# Patient Record
(demographics unavailable — no encounter records)

---

## 2024-10-24 NOTE — PHYSICAL EXAM
[Ambulatory and capable of all self care but unable to carry out any work activities] : Status 2- Ambulatory and capable of all self care but unable to carry out any work activities. Up and about more than 50% of waking hours [Normal] : affect appropriate [de-identified] : anicteric

## 2024-10-24 NOTE — ADDENDUM
[FreeTextEntry1] : All medical record entries made by the Scribe were at my, Dr. Mukul Ramos, direction and personally dictated by me on 10/23/2024. I have reviewed the chart and agree that the record accurately reflects my personal performance of the history, physical exam, assessment and plan. I have also personally directed, reviewed, and agreed with the chart.   I, Karlos Carlton, documented this note as a scribe on behalf of Dr. Mukul Ramos on 10/23/2024.

## 2024-10-24 NOTE — RESULTS/DATA
[FreeTextEntry1] : Ms. Nelson is a pleasant 89 year-old woman admitted for symptomatic anemia in the setting of recent NSAID and DOAC use, here in follow up.

## 2024-10-24 NOTE — PHYSICAL EXAM
[Ambulatory and capable of all self care but unable to carry out any work activities] : Status 2- Ambulatory and capable of all self care but unable to carry out any work activities. Up and about more than 50% of waking hours [Normal] : affect appropriate [de-identified] : anicteric

## 2024-10-24 NOTE — HISTORY OF PRESENT ILLNESS
[de-identified] : 88 yo F here for evaluation of anemia. Recent hospitalization in early June, 2024 to [facility] [place] for symptomatic anemia. Received multiple units of PRBCs at a prior admission for similar symptoms in May. Most recent hgb 7.9g, refusing GI evaluation for presumed blood loss. Ferritin during hospitalization was 17. Xarelto for afib was held. Received 2 doses of venofer 100mg.  Currently taking iron supplements.  On questioning, Gin states that she was taking 800 mg ibuprofen prior to her first hospitalization. Patient was severely fatigued and had cognitive symptoms, but improved now.  Non-Smoker/ No Alcohol. Denies fever, SOB, chest pain, night sweats, or weight loss. No spontaneous bruising or bleeding.   [de-identified] : She returns today for a follow up. Continues w/ po iron. Takes daily stool softeners which assists with related constipation. Denies any bleeding. Notes improvement in energy levels, allowing her to do daily tasks including laundry and cooking.  Today's CBC: WBC 6.84 K/uL, HGB 10.2 g/dL, HCT 33.5%, .

## 2024-10-24 NOTE — REVIEW OF SYSTEMS
[Fever] : no fever [Chills] : no chills [Fatigue] : no fatigue [Chest Pain] : no chest pain [Palpitations] : no palpitations [Shortness Of Breath] : no shortness of breath [Wheezing] : no wheezing [Abdominal Pain] : no abdominal pain [Vomiting] : no vomiting [Easy Bleeding] : no tendency for easy bleeding [Easy Bruising] : no tendency for easy bruising

## 2024-10-24 NOTE — HISTORY OF PRESENT ILLNESS
[de-identified] : 88 yo F here for evaluation of anemia. Recent hospitalization in early June, 2024 to [facility] [place] for symptomatic anemia. Received multiple units of PRBCs at a prior admission for similar symptoms in May. Most recent hgb 7.9g, refusing GI evaluation for presumed blood loss. Ferritin during hospitalization was 17. Xarelto for afib was held. Received 2 doses of venofer 100mg.  Currently taking iron supplements.  On questioning, Gin states that she was taking 800 mg ibuprofen prior to her first hospitalization. Patient was severely fatigued and had cognitive symptoms, but improved now.  Non-Smoker/ No Alcohol. Denies fever, SOB, chest pain, night sweats, or weight loss. No spontaneous bruising or bleeding.   [de-identified] : She returns today for a follow up. Continues w/ po iron. Takes daily stool softeners which assists with related constipation. Denies any bleeding. Notes improvement in energy levels, allowing her to do daily tasks including laundry and cooking.  Today's CBC: WBC 6.84 K/uL, HGB 10.2 g/dL, HCT 33.5%, .

## 2024-11-04 NOTE — PLAN
[FreeTextEntry1] :  7 day supplu of oxycodone given  instructed to follow up with pain management  we can document an appointment and try to bridge your medication  healthy diet

## 2024-11-04 NOTE — PHYSICAL EXAM
[Normal] : normal rate, regular rhythm, normal S1 and S2 and no murmur heard [No Carotid Bruits] : no carotid bruits [No Edema] : there was no peripheral edema [de-identified] : except as stated in cc [de-identified] : ambulates with cane

## 2024-11-04 NOTE — HISTORY OF PRESENT ILLNESS
[FreeTextEntry1] : presents for refill on medication  explained to patient and  that I can only give a 7 day supply of opioids will need to see pain management   explained if they set up a date  we will try to bridge medication to cover her

## 2024-11-04 NOTE — REVIEW OF SYSTEMS
[Back Pain] : back pain [Negative] : Respiratory [Chest Pain] : no chest pain [Palpitations] : no palpitations English

## 2024-11-12 NOTE — HEALTH RISK ASSESSMENT
[No falls in past year] : Patient reported no falls in the past year [Assistive Device] : Patient uses an assistive device

## 2024-11-12 NOTE — PLAN
[FreeTextEntry1] : Patient declining home PT, home care. Patient agreeable to lidocaine patches and tylenol. Extensive fall education was provided. Patient was educated to RTO or call for any further concerns. As per patient daughter, Jessica, this patient has had two recent hospitalizations and high suspicion for GI bleeding due to her advil use. Jessica stated that her weakness often coincides with needing a transfusion. Spoke to Jessica that we have a low threshold to do labs. Jessica also states her mother will not tolerate just tylenol and "It's like taking M&M's for her. The oxycodone allowed her to cook and walk and move around." Jessica also states she will call to clarify medication list.

## 2024-11-12 NOTE — CURRENT MEDS
[Lack of understanding] : lack of understanding [FreeTextEntry1] : Daughter Jessica does medication 7 day fills. Patient and spouse do not know what medications they take.  They take medicaitons, "In the morning and at night."

## 2024-11-12 NOTE — ASSESSMENT
[FreeTextEntry1] : This is a frail 88 yo F who presents for pain management. Being followed by Hem. Vital signs WNL, medication reconciliation was not done since daughter was not present. Patient was taking long term oxycodone and has (as per phone call with daughter, Jessica) declined going to pain management. Upon presentation, patient asks this writer if she can take Tylenol for her back pain and associated muscle tightness. She describes having an episode of muscle weakness and inability to walk this morning. This was not precipitated by medications, pain. Was not associated with dizziness or nausea. She reports, "I just sat there and waited, because I was so unsteady." ROS was positive for complaints of subjective weakness. She denies GI, respiratory, neurological complaints. PE was grossly normal.

## 2025-04-03 NOTE — REVIEW OF SYSTEMS
[Discharge] : discharge [Redness] : redness [Itching] : itching [Nasal Discharge] : nasal discharge [Negative] : Respiratory

## 2025-04-03 NOTE — ASSESSMENT
[FreeTextEntry1] : 90F here for eval of severe pain. Patient presents with . Daughter Jessica was updated with summary of visit via phone. Hx. of arthritis, CHF, anemia, GERD, HTN, HLD, arrythmia, falls. States she has had sinus infection for two weeks and severe back pain from her arthritis. Denies fever, SOB, or ear pain. She states she has had headache, sinus pressure, rhinorrhea, and itching/tearing from her eyes. Her exam is stable. Vitals are WNL, weight is the same.   Pain- short course of opioids, counseled on safe use, start laxative, use walker, c/w lidocaine and Tylenol use, c/w heating pad, her medication use is reviewed.  She is aware of the risks associated with using opioids for her pain. Has fallen in the past, reports the pain impedes her mobility, resistant to PT. Fall precautions are reviewed. Referral is placed to Palliative Care. She states, she "doesn't believe" in pain management specialists.  Needs long term management of pain.  URI- empiric Augmentin, take with food

## 2025-05-21 NOTE — ASSESSMENT
[FreeTextEntry1] : 90F here for eval of severe pain. Patient presents with . Daughter Jessica was updated with summary of visit via phone. Hx. of arthritis, CHF, anemia, GERD, HTN, HLD, arrythmia, falls.  Her exam is stable. Vitals are WNL, weight has increased 6 lbs.  Anemia- patient has had consultation with palliative care for pain management and subsequently had a CBC drawn with a low H&H. Redrawn today. No active sources noted, denies hematuria, hematochezia, hematemesis. She feels well and states "she has always had anemia." Takes iron supplements daily. Reports having adequate nutritional intake. Denies weakness, falls. Does not take NSAIDS. Has had hospitalization for PRBC in the past.  Will likely need consultation/follow up with Hematology.

## 2025-06-26 NOTE — ASSESSMENT
[FreeTextEntry1] : 90F here for eval of UTI. Patient presents with . Daughter Jessica was updated with summary of visit via phone. Hx. of arthritis, mild dementia/memory deficit, CHF, anemia, GERD, HTN, HLD, arrythmia, falls. No acute distress. Vitals are WNL, weight has decreased 6 lbs. Recent ER visit for syncope, given round of Keflex for UTI. Elevated Cr, D-Dimer, and potassium. Discharged home no services as per family request.  She states she feels "something still isn't right." Denies dysuria. Unable to report hesitancy or frequency. Denies pain, fever. Unable to produce urine sample. Concern for caregiver burnout.  is overwhelmed and not feeling well, declines treatment. Patient with signs of decline, DNR/DNI.  Patient's  left with patient before completion of full H&P, exam etc. Daughter Jessica aware of all.  Anemia- patient and family declined treatment or evaluation at this time. Recent ER H &H was stable, 8.7/30.1  UTI- empiric Bactrim given, hold spironolactone for duration of treatment. Drop off sample at earliest convenience to this office or a lab.

## 2025-07-03 NOTE — PLAN
Previously Declined (within the last year)
[FreeTextEntry1] : 89-year-old female accompanied by spouse and daughter Urinary incontinence-tamsulosin 0.4 mg daily Coronary artery disease/spironolactone 25 mg daily 120/80 Chronic pain syndrome-chronic low back pain and arthritis alleviated with oxycodone 5 mg.  Daughter is asking that she use the medication more frequently right now she is using it about twice a day with relief but feels she might need 1/3 tablet
[FreeTextEntry1] : 89-year-old female accompanied by spouse and daughter Urinary incontinence-tamsulosin 0.4 mg daily Coronary artery disease/spironolactone 25 mg daily 120/80 Chronic pain syndrome-chronic low back pain and arthritis alleviated with oxycodone 5 mg.  Daughter is asking that she use the medication more frequently right now she is using it about twice a day with relief but feels she might need 1/3 tablet